# Patient Record
Sex: FEMALE | URBAN - METROPOLITAN AREA
[De-identification: names, ages, dates, MRNs, and addresses within clinical notes are randomized per-mention and may not be internally consistent; named-entity substitution may affect disease eponyms.]

---

## 2019-10-12 PROBLEM — E28.2 PCOS (POLYCYSTIC OVARIAN SYNDROME): Status: ACTIVE | Noted: 2019-10-12

## 2022-01-26 PROBLEM — M06.00 SERONEGATIVE RHEUMATOID ARTHRITIS (HCC): Status: ACTIVE | Noted: 2022-01-26

## 2022-07-11 LAB
AMB EXT TREPONEMAL ANTIBODIES: NONREACTIVE
ANTIBODY SCREEN OB: NEGATIVE
HEPATITIS B SURFACE ANTIGEN OB: NEGATIVE
HIV RESULT OB: NEGATIVE

## 2022-11-08 LAB
AMB EXT TREPONEMAL ANTIBODIES: NONREACTIVE
HIV RESULT OB: NEGATIVE

## 2022-12-03 NOTE — TRIAGE
[facility]      Triage Note    Margi Edwards Patient Status:  Outpatient    2000 MRN [id number]   Location [address] Attending Dolores Avitia MD   [address] Day # 0 PCP Paulino Temple     [facility] Para: [id number]  Estimated Date of Delivery: 1/15/23  Gestation: 33w6d    Chief Complaint    Vaginal Bleeding         Allergies:  No Known Allergies    Orders Placed This Encounter      FDP, PLASMA      Fibrinogen Activity      PTT, Activated      Prothrombin Time (PT)      ABO GROUPING      Lab Results   Component Value Date    PTT 30.4 2022    INR 1.09 2022       [place] UA  No results found for: Malone Person, Paullette Dust, Lovette Dam, Mago Kate, PHUR, PROTURINE, UROBILI, URINENITRITE, URINELEUK, URINECUL    UA  No results found for: Genaro Demark, SPECGRAVITY, PROUR, GLUUR, KETUR, BILUR, BLOODURINE, NITRITE, UROBILINOGEN, LEUUR, UASA     22  1135   BP: 122/76   Pulse: 99       NST  Variability: Moderate           Accelerations: Yes           Decelerations: None            Baseline: 135 BPM           Uterine Irritability: No           Contractions: Not present                                        Acoustic Stimulator: No           Nonstress Test Interpretation: Reactive           Nonstress Test Second Interpretation: Reactive          FHR Category: Category I             Additional Comments       Patient presents with:  Vaginal Bleeding: Noted small amount of bright red in the toilet this morning. Pt states now light pink with wiping. EFM tracing reactive with no contractions noted. No further bleeding noted while in triage. Sterile spec exam done. Scant bloody mucous noted. SVE done-0/0/-3. No bleeding noted with vaginal exam. Lab results, speculum and SVE findings and reactive NST reported to Dr Sravanthi Ram. Discharge order received. Pt instructed on pelvic rest and to report vaginal bleeding to OB.  Kick count instructions reviewed with pt. Pt states understanding. Discharged to home.     Gavin Rosenberg RN  12/3/2022 1:19 PM

## 2022-12-04 NOTE — PROGRESS NOTES
Physician Evaluation      NST Interpretation: Reactive    Disposition:   Discharged    Comments:    Abruption labs normal.  acontractile with closed cervix.   F/u as scheduled in office    Charla Ryan MD

## 2022-12-20 LAB — STREP GP B CULT OB: NEGATIVE

## 2023-01-01 ENCOUNTER — HOSPITAL ENCOUNTER (OUTPATIENT)
Facility: HOSPITAL | Age: 23
Discharge: HOME | End: 2023-01-01
Attending: OBSTETRICS & GYNECOLOGY | Admitting: OBSTETRICS & GYNECOLOGY
Payer: COMMERCIAL

## 2023-01-01 VITALS
TEMPERATURE: 98 F | SYSTOLIC BLOOD PRESSURE: 129 MMHG | RESPIRATION RATE: 18 BRPM | HEART RATE: 98 BPM | DIASTOLIC BLOOD PRESSURE: 73 MMHG

## 2023-01-01 PROCEDURE — 59025 FETAL NON-STRESS TEST: CPT

## 2023-01-01 PROCEDURE — 99214 OFFICE O/P EST MOD 30 MIN: CPT

## 2023-01-01 NOTE — PROGRESS NOTES
Pt is a 20year old female admitted to TR3/TR3-A. Patient presents with:  R/o Rom: Pt presents with c/o LOF since 130am. +FM, denies contractions     Pt is  38w0d intra-uterine pregnancy. History obtained, consents signed. Oriented to room, staff, and plan of care.

## 2023-01-14 ENCOUNTER — HOSPITAL ENCOUNTER (INPATIENT)
Facility: HOSPITAL | Age: 23
LOS: 2 days | Discharge: HOME | End: 2023-01-16
Attending: OBSTETRICS & GYNECOLOGY | Admitting: OBSTETRICS & GYNECOLOGY
Payer: COMMERCIAL

## 2023-01-14 ENCOUNTER — ANESTHESIA EVENT (OUTPATIENT)
Dept: OBGYN UNIT | Facility: HOSPITAL | Age: 23
End: 2023-01-14
Payer: COMMERCIAL

## 2023-01-14 ENCOUNTER — ANESTHESIA (OUTPATIENT)
Dept: OBGYN UNIT | Facility: HOSPITAL | Age: 23
End: 2023-01-14
Payer: COMMERCIAL

## 2023-01-14 PROBLEM — Z34.90 PREGNANCY (HCC): Status: ACTIVE | Noted: 2023-01-14

## 2023-01-14 PROBLEM — Z34.90 PREGNANCY: Status: ACTIVE | Noted: 2023-01-14

## 2023-01-14 LAB
ANTIBODY SCREEN: NEGATIVE
BASOPHILS # BLD AUTO: 0.03 X10(3) UL (ref 0–0.2)
BASOPHILS NFR BLD AUTO: 0.2 %
DEPRECATED RDW RBC AUTO: 42.2 FL (ref 35.1–46.3)
EOSINOPHIL # BLD AUTO: 0.11 X10(3) UL (ref 0–0.7)
EOSINOPHIL NFR BLD AUTO: 0.8 %
ERYTHROCYTE [DISTWIDTH] IN BLOOD BY AUTOMATED COUNT: 13.7 % (ref 11–15)
GLUCOSE BLDC GLUCOMTR-MCNC: 107 MG/DL (ref 70–99)
GLUCOSE BLDC GLUCOMTR-MCNC: 117 MG/DL (ref 70–99)
GLUCOSE BLDC GLUCOMTR-MCNC: 121 MG/DL (ref 70–99)
HCT VFR BLD AUTO: 35.3 %
HGB BLD-MCNC: 11.1 G/DL
IMM GRANULOCYTES # BLD AUTO: 0.1 X10(3) UL (ref 0–1)
IMM GRANULOCYTES NFR BLD: 0.8 %
LYMPHOCYTES # BLD AUTO: 1.84 X10(3) UL (ref 1–4)
LYMPHOCYTES NFR BLD AUTO: 14.1 %
MCH RBC QN AUTO: 26.6 PG (ref 26–34)
MCHC RBC AUTO-ENTMCNC: 31.4 G/DL (ref 31–37)
MCV RBC AUTO: 84.4 FL
MONOCYTES # BLD AUTO: 0.73 X10(3) UL (ref 0.1–1)
MONOCYTES NFR BLD AUTO: 5.6 %
NEUTROPHILS # BLD AUTO: 10.23 X10 (3) UL (ref 1.5–7.7)
NEUTROPHILS # BLD AUTO: 10.23 X10(3) UL (ref 1.5–7.7)
NEUTROPHILS NFR BLD AUTO: 78.5 %
PLATELET # BLD AUTO: 332 10(3)UL (ref 150–450)
RBC # BLD AUTO: 4.18 X10(6)UL
RH BLOOD TYPE: POSITIVE
RH BLOOD TYPE: POSITIVE
SARS-COV-2 RNA RESP QL NAA+PROBE: NOT DETECTED
WBC # BLD AUTO: 13 X10(3) UL (ref 4–11)

## 2023-01-14 PROCEDURE — 0HQ9XZZ REPAIR PERINEUM SKIN, EXTERNAL APPROACH: ICD-10-PCS | Performed by: OBSTETRICS & GYNECOLOGY

## 2023-01-14 PROCEDURE — 88307 TISSUE EXAM BY PATHOLOGIST: CPT | Performed by: OBSTETRICS & GYNECOLOGY

## 2023-01-14 PROCEDURE — 86850 RBC ANTIBODY SCREEN: CPT | Performed by: OBSTETRICS & GYNECOLOGY

## 2023-01-14 PROCEDURE — 99214 OFFICE O/P EST MOD 30 MIN: CPT

## 2023-01-14 PROCEDURE — 85025 COMPLETE CBC W/AUTO DIFF WBC: CPT | Performed by: OBSTETRICS & GYNECOLOGY

## 2023-01-14 PROCEDURE — 82962 GLUCOSE BLOOD TEST: CPT

## 2023-01-14 PROCEDURE — 86900 BLOOD TYPING SEROLOGIC ABO: CPT | Performed by: OBSTETRICS & GYNECOLOGY

## 2023-01-14 PROCEDURE — 86901 BLOOD TYPING SEROLOGIC RH(D): CPT | Performed by: OBSTETRICS & GYNECOLOGY

## 2023-01-14 PROCEDURE — 10H07YZ INSERTION OF OTHER DEVICE INTO PRODUCTS OF CONCEPTION, VIA NATURAL OR ARTIFICIAL OPENING: ICD-10-PCS | Performed by: OBSTETRICS & GYNECOLOGY

## 2023-01-14 RX ORDER — IBUPROFEN 600 MG/1
600 TABLET ORAL EVERY 6 HOURS
Status: DISCONTINUED | OUTPATIENT
Start: 2023-01-14 | End: 2023-01-16

## 2023-01-14 RX ORDER — ACETAMINOPHEN 500 MG
500 TABLET ORAL EVERY 6 HOURS PRN
Status: DISCONTINUED | OUTPATIENT
Start: 2023-01-14 | End: 2023-01-14

## 2023-01-14 RX ORDER — ACETAMINOPHEN 500 MG
1000 TABLET ORAL EVERY 6 HOURS PRN
Status: DISCONTINUED | OUTPATIENT
Start: 2023-01-14 | End: 2023-01-16

## 2023-01-14 RX ORDER — TRISODIUM CITRATE DIHYDRATE AND CITRIC ACID MONOHYDRATE 500; 334 MG/5ML; MG/5ML
30 SOLUTION ORAL AS NEEDED
Status: DISCONTINUED | OUTPATIENT
Start: 2023-01-14 | End: 2023-01-14 | Stop reason: HOSPADM

## 2023-01-14 RX ORDER — IBUPROFEN 600 MG/1
600 TABLET ORAL EVERY 6 HOURS PRN
Status: DISCONTINUED | OUTPATIENT
Start: 2023-01-14 | End: 2023-01-16

## 2023-01-14 RX ORDER — BUPIVACAINE HCL/0.9 % NACL/PF 0.25 %
5 PLASTIC BAG, INJECTION (ML) EPIDURAL AS NEEDED
Status: DISCONTINUED | OUTPATIENT
Start: 2023-01-14 | End: 2023-01-14

## 2023-01-14 RX ORDER — DOCUSATE SODIUM 100 MG/1
100 CAPSULE, LIQUID FILLED ORAL
Status: DISCONTINUED | OUTPATIENT
Start: 2023-01-14 | End: 2023-01-16

## 2023-01-14 RX ORDER — SIMETHICONE 80 MG
80 TABLET,CHEWABLE ORAL 3 TIMES DAILY PRN
Status: DISCONTINUED | OUTPATIENT
Start: 2023-01-14 | End: 2023-01-16

## 2023-01-14 RX ORDER — TERBUTALINE SULFATE 1 MG/ML
0.25 INJECTION, SOLUTION SUBCUTANEOUS AS NEEDED
Status: DISCONTINUED | OUTPATIENT
Start: 2023-01-14 | End: 2023-01-14 | Stop reason: HOSPADM

## 2023-01-14 RX ORDER — BUPIVACAINE HYDROCHLORIDE 2.5 MG/ML
20 INJECTION, SOLUTION EPIDURAL; INFILTRATION; INTRACAUDAL ONCE
Status: DISCONTINUED | OUTPATIENT
Start: 2023-01-14 | End: 2023-01-14 | Stop reason: HOSPADM

## 2023-01-14 RX ORDER — ACETAMINOPHEN 500 MG
1000 TABLET ORAL EVERY 6 HOURS PRN
Status: DISCONTINUED | OUTPATIENT
Start: 2023-01-14 | End: 2023-01-14 | Stop reason: HOSPADM

## 2023-01-14 RX ORDER — AMMONIA INHALANTS 0.04 G/.3ML
0.3 INHALANT RESPIRATORY (INHALATION) AS NEEDED
Status: DISCONTINUED | OUTPATIENT
Start: 2023-01-14 | End: 2023-01-14 | Stop reason: HOSPADM

## 2023-01-14 RX ORDER — NALBUPHINE HCL 10 MG/ML
2.5 AMPUL (ML) INJECTION
Status: DISCONTINUED | OUTPATIENT
Start: 2023-01-14 | End: 2023-01-14

## 2023-01-14 RX ORDER — ONDANSETRON 2 MG/ML
4 INJECTION INTRAMUSCULAR; INTRAVENOUS EVERY 6 HOURS PRN
Status: DISCONTINUED | OUTPATIENT
Start: 2023-01-14 | End: 2023-01-16

## 2023-01-14 RX ORDER — ONDANSETRON 2 MG/ML
4 INJECTION INTRAMUSCULAR; INTRAVENOUS EVERY 6 HOURS PRN
Status: DISCONTINUED | OUTPATIENT
Start: 2023-01-14 | End: 2023-01-14 | Stop reason: HOSPADM

## 2023-01-14 RX ORDER — DEXTROSE, SODIUM CHLORIDE, SODIUM LACTATE, POTASSIUM CHLORIDE, AND CALCIUM CHLORIDE 5; .6; .31; .03; .02 G/100ML; G/100ML; G/100ML; G/100ML; G/100ML
INJECTION, SOLUTION INTRAVENOUS AS NEEDED
Status: DISCONTINUED | OUTPATIENT
Start: 2023-01-14 | End: 2023-01-14 | Stop reason: HOSPADM

## 2023-01-14 RX ORDER — BUPIVACAINE HYDROCHLORIDE 2.5 MG/ML
INJECTION, SOLUTION EPIDURAL; INFILTRATION; INTRACAUDAL
Status: COMPLETED | OUTPATIENT
Start: 2023-01-14 | End: 2023-01-14

## 2023-01-14 RX ORDER — ACETAMINOPHEN 500 MG
500 TABLET ORAL EVERY 6 HOURS PRN
Status: DISCONTINUED | OUTPATIENT
Start: 2023-01-14 | End: 2023-01-16

## 2023-01-14 RX ORDER — BISACODYL 10 MG
10 SUPPOSITORY, RECTAL RECTAL ONCE AS NEEDED
Status: DISCONTINUED | OUTPATIENT
Start: 2023-01-14 | End: 2023-01-16

## 2023-01-14 RX ORDER — CHOLECALCIFEROL (VITAMIN D3) 25 MCG
1 TABLET,CHEWABLE ORAL DAILY
Status: DISCONTINUED | OUTPATIENT
Start: 2023-01-14 | End: 2023-01-16

## 2023-01-14 RX ORDER — SODIUM CHLORIDE, SODIUM LACTATE, POTASSIUM CHLORIDE, CALCIUM CHLORIDE 600; 310; 30; 20 MG/100ML; MG/100ML; MG/100ML; MG/100ML
INJECTION, SOLUTION INTRAVENOUS CONTINUOUS
Status: DISCONTINUED | OUTPATIENT
Start: 2023-01-14 | End: 2023-01-14 | Stop reason: HOSPADM

## 2023-01-14 RX ORDER — AMMONIA INHALANTS 0.04 G/.3ML
0.3 INHALANT RESPIRATORY (INHALATION) AS NEEDED
Status: DISCONTINUED | OUTPATIENT
Start: 2023-01-14 | End: 2023-01-16

## 2023-01-14 RX ORDER — LIDOCAINE HYDROCHLORIDE AND EPINEPHRINE 15; 5 MG/ML; UG/ML
INJECTION, SOLUTION EPIDURAL
Status: COMPLETED | OUTPATIENT
Start: 2023-01-14 | End: 2023-01-14

## 2023-01-14 RX ORDER — DIAPER,BRIEF,INFANT-TODD,DISP
1 EACH MISCELLANEOUS EVERY 6 HOURS PRN
Status: DISCONTINUED | OUTPATIENT
Start: 2023-01-14 | End: 2023-01-16

## 2023-01-14 RX ORDER — LIDOCAINE HYDROCHLORIDE AND EPINEPHRINE 20; 5 MG/ML; UG/ML
INJECTION, SOLUTION EPIDURAL; INFILTRATION; INTRACAUDAL; PERINEURAL
Status: DISCONTINUED
Start: 2023-01-14 | End: 2023-01-14 | Stop reason: WASHOUT

## 2023-01-14 RX ORDER — LIDOCAINE HYDROCHLORIDE 10 MG/ML
30 INJECTION, SOLUTION EPIDURAL; INFILTRATION; INTRACAUDAL; PERINEURAL ONCE
Status: DISCONTINUED | OUTPATIENT
Start: 2023-01-14 | End: 2023-01-14 | Stop reason: HOSPADM

## 2023-01-14 RX ORDER — LIDOCAINE HYDROCHLORIDE 10 MG/ML
INJECTION, SOLUTION INFILTRATION; PERINEURAL
Status: COMPLETED | OUTPATIENT
Start: 2023-01-14 | End: 2023-01-14

## 2023-01-14 RX ADMIN — LIDOCAINE HYDROCHLORIDE AND EPINEPHRINE 5 ML: 15; 5 INJECTION, SOLUTION EPIDURAL at 06:21:00

## 2023-01-14 RX ADMIN — LIDOCAINE HYDROCHLORIDE 5 ML: 10 INJECTION, SOLUTION INFILTRATION; PERINEURAL at 06:21:00

## 2023-01-14 RX ADMIN — BUPIVACAINE HYDROCHLORIDE 5 ML: 2.5 INJECTION, SOLUTION EPIDURAL; INFILTRATION; INTRACAUDAL at 06:21:00

## 2023-01-14 NOTE — PLAN OF CARE
Problem: PAIN - ADULT  Goal: Verbalizes/displays adequate comfort level or patient's stated pain goal  Description: INTERVENTIONS:  - Encourage pt to monitor pain and request assistance  - Assess pain using appropriate pain scale  - Administer analgesics based on type and severity of pain and evaluate response  - Implement non-pharmacological measures as appropriate and evaluate response  - Consider cultural and social influences on pain and pain management  - Manage/alleviate anxiety  - Utilize distraction and/or relaxation techniques  - Monitor for opioid side effects  - Notify MD/LIP if interventions unsuccessful or patient reports new pain  - Anticipate increased pain with activity and pre-medicate as appropriate  1/14/2023 1718 by Yudith Nichols RN  Outcome: Progressing  1/14/2023 1718 by Yudith Nichols RN  Outcome: Progressing     Problem: ANXIETY  Goal: Will report anxiety at manageable levels  Description: INTERVENTIONS:  - Administer medication as ordered  - Teach and rehearse alternative coping skills  - Provide emotional support with 1:1 interaction with staff  Outcome: Progressing     Problem: POSTPARTUM  Goal: Long Term Goal:Experiences normal postpartum course  Description: INTERVENTIONS:  - Assess and monitor vital signs and lab values. - Assess fundus and lochia. - Provide ice/sitz baths for perineum discomfort. - Monitor healing of incision/episiotomy/laceration, and assess for signs and symptoms of infection and hematoma. - Assess bladder function and monitor for bladder distention.  - Provide/instruct/assist with pericare as needed. - Provide VTE prophylaxis as needed. - Monitor bowel function.  - Encourage ambulation and provide assistance as needed. - Assess and monitor emotional status and provide social service/psych resources as needed. - Utilize standard precautions and use personal protective equipment as indicated.  Ensure aseptic care of all intravenous lines and invasive tubes/drains.  - Obtain immunization and exposure to communicable diseases history. Outcome: Progressing  Goal: Optimize infant feeding at the breast  Description: INTERVENTIONS:  - Initiate breast feeding within first hour after birth. - Monitor effectiveness of current breast feeding efforts. - Assess support systems available to mother/family.  - Identify cultural beliefs/practices regarding lactation, letdown techniques, maternal food preferences. - Assess mother's knowledge and previous experience with breast feeding.  - Provide information as needed about early infant feeding cues (e.g., rooting, lip smacking, sucking fingers/hand) versus late cue of crying.  - Discuss/demonstrate breast feeding aids (e.g., infant sling, nursing footstool/pillows, and breast pumps). - Encourage mother/other family members to express feelings/concerns, and actively listen. - Educate father/SO about benefits of breast feeding and how to manage common lactation challenges. - Recommend avoidance of specific medications or substances incompatible with breast feeding.  - Assess and monitor for signs of nipple pain/trauma. - Instruct and provide assistance with proper latch. - Review techniques for milk expression (breast pumping) and storage of breast milk. Provide pumping equipment/supplies, instructions and assistance, as needed. - Encourage rooming-in and breast feeding on demand.  - Encourage skin-to-skin contact. - Provide LC support as needed. - Assess for and manage engorgement. - Provide breast feeding education handouts and information on community breast feeding support. Outcome: Progressing  Goal: Establishment of adequate milk supply with medication/procedure interruptions  Description: INTERVENTIONS:  - Review techniques for milk expression (breast pumping). - Provide pumping equipment/supplies, instructions, and assistance until it is safe to breastfeed infant.   Outcome: Progressing  Goal: Appropriate maternal -  bonding  Description: INTERVENTIONS:  - Assess caregiver- interactions. - Assess caregiver's emotional status and coping mechanisms. - Encourage caregiver to participate in  daily care. - Assess support systems available to mother/family.  - Provide /case management support as needed. Outcome: Progressing       Received patient into room 359 via wheelchair . Bedside shift report received from STEPH Sanches. Pt transferred to bed. Bed in lowest and locked position. Side rails up x2. VSS. IV site unremarkable. Baby present in open crib. ID bands matched with L&D RN. Patient and family oriented to unit, room and call light within reach. Safety measures in place, POC followed. Will continue to monitor per protocol. Advised to call for assistance to bathroom.

## 2023-01-14 NOTE — L&D DELIVERY NOTE
Amparo Patel [[id number]]    Labor Events     labor?: No   steroids?: None  Antibiotics received during labor?: No  Antibiotics (enter # doses in comment): none  Rupture date/time: 2023 2300     Rupture type: SROM  Fluid color: Clear  Induction: None     Labor Event Times    Dilation complete date/time: 2023 1405      Presentation    No data filed     Operative Delivery    No data filed     Shoulder Dystocia    No data filed     Anesthesia    No data filed      Delivery    Head delivery date/time: 2023 14:27:15   Delivery date/time:  23 14:27:25     Details:        Delivery Providers     Delivery personnel:  Provider Role    Baby Nurse    Delivery Nurse         Cord    No data filed      Measurements    Weight: 3200 g 7 lb 0.9 oz          Placenta    No data filed     Apgars    Living status: Living   Apgar Scoring Key:    0 1 2    Skin color Blue or pale Acrocyanotic Completely pink    Heart rate Absent <100 bpm >100 bpm    Reflex irritability No response Grimace Cry or active withdrawal    Muscle tone Limp Some flexion Active motion    Respiratory effort Absent Weak cry; hypoventilation Good, crying              1 Minute:  5 Minute:  10 Minute:  15 Minute:  20 Minute:    Skin color: 1  1       Heart rate: 2  2       Reflex irritablity: 2  2       Muscle tone: 2  2       Respiratory effort: 2  2       Total: 9  9          Apgars assigned by: Alena Girard RN     Skin to Skin    Skin to skin initiated date/time: 2023 1442  Skin to skin with:  Mother     Vaginal Count    No data filed     Delivery (Maternal)    No data filed          [facility]    Vaginal Delivery Note    Sherrill Marquez Patient Status:  Inpatient    2000 MRN [id number]   Location [address] [facility] Attending Laci Coronel MD   [facility] Day # 0 PCP [address]     Delivery     Infant  Date of Delivery: 2023   Time of Delivery: 2:27 PM  Delivery Type:      Infant Sex  Information for the patient's : Cuong Gray Girl [[id number]]   female    Infant Birthweight  Information for the patient's : Cuong Gray Girl [[id number]]   7 lb 0.9 oz (3.2 kg)     Presentation    Position          Apgars:  1 minute: 9               5 minutes: 9                        10 minutes:      Placenta:  Date/Time of Delivery:     Delivery: spontaneous  Placenta to Pathology: yes due to maternal fever    Umbilical Cord:  Cord Gases Submitted: yes  Cord Blood/Tissue Collection: yes  Cord Complications: none  Sponge and Needle Counts:  Verified    Maternal Anesthesia: epidural     Episiotomy/Laceration Repair  Laceration: perineal first degree    Delivery Complications  none    Neonatologist Present: yes due to maternal fever    Delivery Narrative: Patient pushed for 25 minutes prior to delivering a live female in ROSANGELA position over intact perineum after nose & mouth bulb suctioned. Infant then delivered in total. Umbilical cord doubly clamped & cut. Infant handed to awaiting mother then to the warmer where the  NP was present to evaluate. First degree laceration repaired with 2-0 Vicryl. No cervical / periuretheral / sulcus lacerations. Placenta delivered spontaneously intact & normal in appearance with 3 vessel cord. EBL 200cc. Fundus firm after delivery. Rectal exam WNL after repair. Mother and baby are doing well.        Madie Womack MD   2023  2:45 PM

## 2023-01-14 NOTE — PROGRESS NOTES
Pt is a 24year old female admitted to TR1/TR1-A. Patient presents with:  R/o Rom: Pt states she felt LOF around 11pm last night with CTXs shortly after, +FM     Pt is  39w6d intra-uterine pregnancy. History obtained, consents signed. Oriented to room, staff, and plan of care.

## 2023-01-14 NOTE — ANESTHESIA POSTPROCEDURE EVALUATION
Patient: Shoaib Loving    Procedure Summary     Date: 01/14/23 Room / Location:     Anesthesia Start: 0621 Anesthesia Stop: 1430    Procedure: LABOR ANALGESIA Diagnosis:     Scheduled Providers:  Anesthesiologist: Graciela Ware MD    Anesthesia Type: epidural ASA Status: 2          Anesthesia Type: epidural    Vitals Value Taken Time   /78 01/14/23 1629   Temp 36.8 01/14/23 1629   Pulse 124 01/14/23 1625   Resp 16 01/14/23 1629   SpO2 99 % 01/14/23 1625   Vitals shown include unvalidated device data.     [facility] Evaluation:   Patient Evaluated in floor  Patient Participation: complete - patient participated  Level of Consciousness: awake and alert  Pain Score: 0  Pain Management: adequate  Airway Patency:patent  Yes    Cardiovascular Status: acceptable and stable  Respiratory Status: acceptable  Postoperative Hydration acceptable      Geronimo Rubio MD  1/14/2023 4:29 PM

## 2023-01-14 NOTE — PROGRESS NOTES
Patient up to bathroom with assist x 2. Voided 200 void at this time. Patient transferred to mother/baby room 359 per wheelchair in stable condition with baby and personal belongings. Accompanied by significant other and staff. Report given to mother/baby RN.

## 2023-01-14 NOTE — ANESTHESIA PROCEDURE NOTES
Labor Analgesia    Date/Time: 1/14/2023 6:21 AM  Performed by: Aislinn Vazquez MD  Authorized by: Aislinn Vazquez MD       General Information and Staff    Start Time:  1/14/2023 6:21 AM  End Time:  1/14/2023 6:21 AM  Anesthesiologist:  Aislinn Vazquez MD  Performed by:   Anesthesiologist  Patient Location:  OB  Reason for Block: labor epidural    Preanesthetic Checklist: patient identified, IV checked, risks and benefits discussed, monitors and equipment checked, pre-op evaluation, timeout performed, anesthesia consent and sterile technique used      Procedure Details    Patient Position:  Sitting  Prep: ChloraPrep    Monitoring:  Heart rate  Approach:  Midline    Epidural Needle    Injection Technique:  ÁNGEL air  Needle Type:  Tuohy  Needle Gauge:  18 G  Needle Length:  3.5 in  Location:  L2-3    Spinal Needle      Catheter    Catheter Type:  Multi-orifice  Catheter Size:  20 G  Test Dose:  Negative    Assessment      Additional Comments

## 2023-01-15 LAB
BASOPHILS # BLD AUTO: 0.05 X10(3) UL (ref 0–0.2)
BASOPHILS NFR BLD AUTO: 0.2 %
DEPRECATED RDW RBC AUTO: 43.6 FL (ref 35.1–46.3)
EOSINOPHIL # BLD AUTO: 0.11 X10(3) UL (ref 0–0.7)
EOSINOPHIL NFR BLD AUTO: 0.5 %
ERYTHROCYTE [DISTWIDTH] IN BLOOD BY AUTOMATED COUNT: 14.2 % (ref 11–15)
HCT VFR BLD AUTO: 30.6 %
HGB BLD-MCNC: 9.7 G/DL
IMM GRANULOCYTES # BLD AUTO: 0.21 X10(3) UL (ref 0–1)
IMM GRANULOCYTES NFR BLD: 1 %
LYMPHOCYTES # BLD AUTO: 2.13 X10(3) UL (ref 1–4)
LYMPHOCYTES NFR BLD AUTO: 9.7 %
MCH RBC QN AUTO: 26.7 PG (ref 26–34)
MCHC RBC AUTO-ENTMCNC: 31.7 G/DL (ref 31–37)
MCV RBC AUTO: 84.3 FL
MONOCYTES # BLD AUTO: 1.12 X10(3) UL (ref 0.1–1)
MONOCYTES NFR BLD AUTO: 5.1 %
NEUTROPHILS # BLD AUTO: 18.28 X10 (3) UL (ref 1.5–7.7)
NEUTROPHILS # BLD AUTO: 18.28 X10(3) UL (ref 1.5–7.7)
NEUTROPHILS NFR BLD AUTO: 83.5 %
PLATELET # BLD AUTO: 284 10(3)UL (ref 150–450)
RBC # BLD AUTO: 3.63 X10(6)UL
WBC # BLD AUTO: 21.9 X10(3) UL (ref 4–11)

## 2023-01-15 PROCEDURE — 85025 COMPLETE CBC W/AUTO DIFF WBC: CPT | Performed by: OBSTETRICS & GYNECOLOGY

## 2023-01-15 NOTE — LACTATION NOTE
LACTATION NOTE - MOTHER      Evaluation Type: Inpatient    Problems identified  Problems identified: Knowledge deficit;Milk supply WNL  Problems Identified Other: nipple shield and supplementation without pumping    Maternal history  Maternal history: Gestational diabetes;Depression  Other/comment: arthritis    Breastfeeding goal  Breastfeeding goal: To maintain breast milk feeding per patient goal    Maternal Assessment  Bilateral Breasts: Symmetrical;Soft  Bilateral Nipples: Colostrum easily expressed;Sore;Slightly everted/short  Prior breastfeeding experience (comment below): Primip  Breastfeeding Assistance: Breastfeeding assistance provided with permission    Pain assessment  Treatment of Sore Nipples: Lanolin; Other (Comment) (pt is using her own breast shells)    Guidelines for use of:  Equipment: Lanolin; Nipple shield;Breast shells  Current use of pump[de-identified] not yet initiated  Suggested use of pump: Pump each time a supplement is offered;Pump after nursing if a nipple shield is used;Pump if infant is not latching to breast  Other (comment): Reviewed first day  normal  feeding behaviors and expected output, supplemenation guideines provided as pt began supplementation overnight. Pumping was encouraged as pt is latching with nipple shield. ETC lactation when infant showing feeding cues for latch assist and teaching,  [facility] # on whiteboard. Pt  was very upset that infant was agitated and unable to latch at the time of  [facility] consult. Infant was calm being held skin to skin when LC left the room.

## 2023-01-15 NOTE — LACTATION NOTE
This note was copied from a baby's chart. 01/15/23 1040   Evaluation Type   Evaluation Type Inpatient   Problems & Assessment   Problems Diagnosed or Identified Latch difficulty   Infant Assessment Hunger cues present   Muscle tone Appropriate for GA   Feeding Assessment   Summary Current Feeding Breastfeeding with formula supplement;Breastfeeding using a nipple shield   Last 24 hour feeding summary supplemented x 2 with 25 ml formula with infant < 20 hours age   Breastfeeding Assessment Assisted with breastfeeding w/mother's permission; Fussy infant, unable to sustain suckling to breast   Breastfeeding Positions football;right breast;cross cradle;left breast   Latch 0   Other (comment) Called by MOB to assist/ assess latch. Infant extremely agitated during latch attempts; attempted  x 3 after calming with persistant refusal and agitation. Supplement volumes of 25 ml formula  given X 2 previous feedings with infant < 20 hours age. Encouraged MOB to hold baby skin to skin and call for latch assist when infant is calm and showing feeding cues.

## 2023-01-15 NOTE — PLAN OF CARE
Problem: Patient Centered Care  Goal: Patient preferences are identified and integrated in the patient's plan of care  Description: Interventions:  - What would you like us to know as we care for you?   - Provide timely, complete, and accurate information to patient/family  - Incorporate patient and family knowledge, values, beliefs, and cultural backgrounds into the planning and delivery of care  - Encourage patient/family to participate in care and decision-making at the level they choose  - Honor patient and family perspectives and choices  Outcome: Progressing     Problem: Patient/Family Goals  Goal: Patient/Family Long Term Goal  Description: Patient's Long Term Goal:     Interventions:  -   - See additional Care Plan goals for specific interventions  Outcome: Progressing  Goal: Patient/Family Short Term Goal  Description: Patient's Short Term Goal:     Interventions:   -   - See additional Care Plan goals for specific interventions  Outcome: Progressing     Problem: PAIN - ADULT  Goal: Verbalizes/displays adequate comfort level or patient's stated pain goal  Description: INTERVENTIONS:  - Encourage pt to monitor pain and request assistance  - Assess pain using appropriate pain scale  - Administer analgesics based on type and severity of pain and evaluate response  - Implement non-pharmacological measures as appropriate and evaluate response  - Consider cultural and social influences on pain and pain management  - Manage/alleviate anxiety  - Utilize distraction and/or relaxation techniques  - Monitor for opioid side effects  - Notify MD/LIP if interventions unsuccessful or patient reports new pain  - Anticipate increased pain with activity and pre-medicate as appropriate  Outcome: Progressing     Problem: ANXIETY  Goal: Will report anxiety at manageable levels  Description: INTERVENTIONS:  - Administer medication as ordered  - Teach and rehearse alternative coping skills  - Provide emotional support with 1:1 interaction with staff  Outcome: Progressing     Problem: POSTPARTUM  Goal: Long Term Goal:Experiences normal postpartum course  Description: INTERVENTIONS:  - Assess and monitor vital signs and lab values. - Assess fundus and lochia. - Provide ice/sitz baths for perineum discomfort. - Monitor healing of incision/episiotomy/laceration, and assess for signs and symptoms of infection and hematoma. - Assess bladder function and monitor for bladder distention.  - Provide/instruct/assist with pericare as needed. - Provide VTE prophylaxis as needed. - Monitor bowel function.  - Encourage ambulation and provide assistance as needed. - Assess and monitor emotional status and provide social service/psych resources as needed. - Utilize standard precautions and use personal protective equipment as indicated. Ensure aseptic care of all intravenous lines and invasive tubes/drains.  - Obtain immunization and exposure to communicable diseases history. Outcome: Progressing  Goal: Optimize infant feeding at the breast  Description: INTERVENTIONS:  - Initiate breast feeding within first hour after birth. - Monitor effectiveness of current breast feeding efforts. - Assess support systems available to mother/family.  - Identify cultural beliefs/practices regarding lactation, letdown techniques, maternal food preferences. - Assess mother's knowledge and previous experience with breast feeding.  - Provide information as needed about early infant feeding cues (e.g., rooting, lip smacking, sucking fingers/hand) versus late cue of crying.  - Discuss/demonstrate breast feeding aids (e.g., infant sling, nursing footstool/pillows, and breast pumps). - Encourage mother/other family members to express feelings/concerns, and actively listen. - Educate father/SO about benefits of breast feeding and how to manage common lactation challenges.   - Recommend avoidance of specific medications or substances incompatible with breast feeding.  - Assess and monitor for signs of nipple pain/trauma. - Instruct and provide assistance with proper latch. - Review techniques for milk expression (breast pumping) and storage of breast milk. Provide pumping equipment/supplies, instructions and assistance, as needed. - Encourage rooming-in and breast feeding on demand.  - Encourage skin-to-skin contact. - Provide LC support as needed. - Assess for and manage engorgement. - Provide breast feeding education handouts and information on community breast feeding support. Outcome: Progressing  Goal: Establishment of adequate milk supply with medication/procedure interruptions  Description: INTERVENTIONS:  - Review techniques for milk expression (breast pumping). - Provide pumping equipment/supplies, instructions, and assistance until it is safe to breastfeed infant. Outcome: Progressing  Goal: Appropriate maternal -  bonding  Description: INTERVENTIONS:  - Assess caregiver- interactions. - Assess caregiver's emotional status and coping mechanisms. - Encourage caregiver to participate in  daily care. - Assess support systems available to mother/family.  - Provide /case management support as needed. Outcome: Progressing    Pain controlled with Tylenol and Motrin. Voiding freely. Fundus firm and midline. Bleeding WDL. Up ad maria luz. Diet tolerated. Interacting appropriately with infant. Will continue plan of care.

## 2023-01-16 VITALS
DIASTOLIC BLOOD PRESSURE: 85 MMHG | BODY MASS INDEX: 31.24 KG/M2 | WEIGHT: 183 LBS | TEMPERATURE: 99 F | HEIGHT: 64 IN | HEART RATE: 95 BPM | OXYGEN SATURATION: 98 % | RESPIRATION RATE: 16 BRPM | SYSTOLIC BLOOD PRESSURE: 130 MMHG

## 2023-01-16 RX ORDER — IBUPROFEN 600 MG/1
600 TABLET ORAL EVERY 6 HOURS PRN
Qty: 30 TABLET | Refills: 0 | Status: SHIPPED | OUTPATIENT
Start: 2023-01-16

## 2023-01-16 RX ORDER — ACETAMINOPHEN 500 MG
500 TABLET ORAL EVERY 6 HOURS PRN
Refills: 0 | Status: SHIPPED | COMMUNITY
Start: 2023-01-16

## 2023-01-16 NOTE — BH PROGRESS NOTE
[facility] attempted to see Lam Conley for EPDS consultation at which time she was having baby portraits taken    [facility] will attempt to see Lam Conley again later today and notified RN Mounika of attempted consultation. [facility]    Note to patient:  The [id number] makes medical notes like these available to patients in the interest of transparency. However, be advised this is a medical document. It is intended as peer to peer communication. It is written in medical language and may contain abbreviations or verbiage that are unfamiliar. It may appear blunt or direct. Medical documents are intended to carry relevant information, facts as evident, and the clinical opinion of the practitioner.

## 2023-01-16 NOTE — DISCHARGE INSTRUCTIONS
Call your healthcare provider right away:   -fever over 100.4  -heavy vaginal bleeding   -severe or worsening pain unrelieved by medication   -severe anxiety or depression  - Dizziness and/or changes in vision  - severe headache,   - calf pain or swelling  - Chest pain or shortness of breath. You are on pelvic rest for 6 weeks. This means nothing in vagina (no intercourse, tampons, etc.) and no baths or swimming pool. No heavy lifting (no more than the baby) until cleared by OB. [facility]  SUPPORTS         Parents support groups:  Cradle talk online (Monday & Friday at 10am)--- support group for any parents of a  in our community-- [address]-- for any expectant or new mom who may be experiencing anxiety, stress or depression. It is lead by a licensed clinical therapist with the option of in-person or online meetings: In person meets once a month for [employer], registration is required  for either option and online ( and  of the month)  virtual on [employer]. For more information for either Cradle talk or Nurturing Mom or to receive a email invite contact [email or web address]      In person Mom & Baby hour support group is every Wednesday from  am, at [address] at [address], [place] in the [address] which is located just inside the front door and to the right. No Registration required just show up, for any new babies, doesn't need to be your first!!! For information for going in person--- Email [email or web address]     Can always see information about in person group on the [address] [place] [address]    MOPS (Mothers of Preschoolers): to engage in groups through [address], go to www.mops. org and search groups by [facility] code    [address] [place] [address] Rd: [phone]   This service is provided by [facility], has a phone line dedicated women (or anyone worried about a women) who may be experiencing signs or symptoms of postpartum depression. Facebook groups-  for more support when home- [address] [facility] --- you can find mom-to-mom advice and the list of speaker topics for cradle talk program.   [address] [place], [address] [place] [address] Avenue:  [facility], Jayson [facility], [address] [facility]  Phone: [phone]    Pattynadja Abilio, [facility]: [address] [facility], [facility], 600 [facility] 53  Phone: [phone]    [facility], [address] [place] [address] Avenue: [address], [address] [place], [address]  Phone: [phone]    [facility]. Sycuan Duty: 7950 [facility], [facility] 92, 1334 [facility]  Phone: [phone]    Dr. Marsha Hilton, PhD: [address]  Phone: [phone]    [address] - multiple locations  Phone: [phone]    [facility]: [place], [facility]  Phone: [phone] [facility].: 9734 [facility] [address], [address]  Phone: [phone]    [facility], [address] [place] [address]  Phone: [phone]    [facility], [place]: [address] [facility] [address] 301 [facility] 83,8Th Floor #F [facility], [address]  Phone: [phone]    [facility]: Ramos Mendes 262 29 [facility], [address]  Phone: [phone] [address] [place], [place]: 1593 [facility] [address] 620 [facility], [address]  Phone: [phone]    [facility]: [address]  Miriam Kline  Phone: [phone]    [facility]: [address] [address] Miriam Kline  Phone: [phone]    Talon Leung[address]: [facility] [place] [address]. [employer].  Keli, [address] [place] [address]  Phone: [phone]    [address] [place] [address] Rd: [address]  Phone: [phone] Ma[address] 6: 755 [facility] 3 [facility], [address] [place] [address]  Phone: [phone]    Marck Ashley 12 SVS: [address]   Phone: [phone] Multi-Specialty: 800 [facility],6Th Floor, [facility]  Phone: [phone]    [place]: [address] [facility]  Phone: [phone]    To find more behavioral health providers in network, go to: [email or web address] and search providers by zip code, specialty, accepting new patients and other specifications.

## 2023-01-16 NOTE — PLAN OF CARE
Problem: Patient Centered Care  Goal: Patient preferences are identified and integrated in the patient's plan of care  Description: Interventions:  - What would you like us to know as we care for you?   - Provide timely, complete, and accurate information to patient/family  - Incorporate patient and family knowledge, values, beliefs, and cultural backgrounds into the planning and delivery of care  - Encourage patient/family to participate in care and decision-making at the level they choose  - Honor patient and family perspectives and choices  Outcome: Completed     Problem: Patient/Family Goals  Goal: Patient/Family Long Term Goal  Description: Patient's Long Term Goal:     Interventions:  -   - See additional Care Plan goals for specific interventions  Outcome: Completed  Goal: Patient/Family Short Term Goal  Description: Patient's Short Term Goal:     Interventions:   -   - See additional Care Plan goals for specific interventions  Outcome: Completed     Problem: PAIN - ADULT  Goal: Verbalizes/displays adequate comfort level or patient's stated pain goal  Description: INTERVENTIONS:  - Encourage pt to monitor pain and request assistance  - Assess pain using appropriate pain scale  - Administer analgesics based on type and severity of pain and evaluate response  - Implement non-pharmacological measures as appropriate and evaluate response  - Consider cultural and social influences on pain and pain management  - Manage/alleviate anxiety  - Utilize distraction and/or relaxation techniques  - Monitor for opioid side effects  - Notify MD/LIP if interventions unsuccessful or patient reports new pain  - Anticipate increased pain with activity and pre-medicate as appropriate  Outcome: Completed     Problem: ANXIETY  Goal: Will report anxiety at manageable levels  Description: INTERVENTIONS:  - Administer medication as ordered  - Teach and rehearse alternative coping skills  - Provide emotional support with 1:1 interaction with staff  Outcome: Completed     Problem: POSTPARTUM  Goal: Long Term Goal:Experiences normal postpartum course  Description: INTERVENTIONS:  - Assess and monitor vital signs and lab values. - Assess fundus and lochia. - Provide ice/sitz baths for perineum discomfort. - Monitor healing of incision/episiotomy/laceration, and assess for signs and symptoms of infection and hematoma. - Assess bladder function and monitor for bladder distention.  - Provide/instruct/assist with pericare as needed. - Provide VTE prophylaxis as needed. - Monitor bowel function.  - Encourage ambulation and provide assistance as needed. - Assess and monitor emotional status and provide social service/psych resources as needed. - Utilize standard precautions and use personal protective equipment as indicated. Ensure aseptic care of all intravenous lines and invasive tubes/drains.  - Obtain immunization and exposure to communicable diseases history. Outcome: Completed  Goal: Optimize infant feeding at the breast  Description: INTERVENTIONS:  - Initiate breast feeding within first hour after birth. - Monitor effectiveness of current breast feeding efforts. - Assess support systems available to mother/family.  - Identify cultural beliefs/practices regarding lactation, letdown techniques, maternal food preferences. - Assess mother's knowledge and previous experience with breast feeding.  - Provide information as needed about early infant feeding cues (e.g., rooting, lip smacking, sucking fingers/hand) versus late cue of crying.  - Discuss/demonstrate breast feeding aids (e.g., infant sling, nursing footstool/pillows, and breast pumps). - Encourage mother/other family members to express feelings/concerns, and actively listen. - Educate father/SO about benefits of breast feeding and how to manage common lactation challenges.   - Recommend avoidance of specific medications or substances incompatible with breast feeding.  - Assess and monitor for signs of nipple pain/trauma. - Instruct and provide assistance with proper latch. - Review techniques for milk expression (breast pumping) and storage of breast milk. Provide pumping equipment/supplies, instructions and assistance, as needed. - Encourage rooming-in and breast feeding on demand.  - Encourage skin-to-skin contact. - Provide LC support as needed. - Assess for and manage engorgement. - Provide breast feeding education handouts and information on community breast feeding support. Outcome: Completed  Goal: Establishment of adequate milk supply with medication/procedure interruptions  Description: INTERVENTIONS:  - Review techniques for milk expression (breast pumping). - Provide pumping equipment/supplies, instructions, and assistance until it is safe to breastfeed infant. Outcome: Completed  Goal: Appropriate maternal -  bonding  Description: INTERVENTIONS:  - Assess caregiver- interactions. - Assess caregiver's emotional status and coping mechanisms. - Encourage caregiver to participate in  daily care. - Assess support systems available to mother/family.  - Provide /case management support as needed.   Outcome: Completed

## 2023-01-16 NOTE — PLAN OF CARE
Problem: Patient Centered Care  Goal: Patient preferences are identified and integrated in the patient's plan of care  Description: Interventions:  - What would you like us to know as we care for you? This is my first baby. - Provide timely, complete, and accurate information to patient/family  - Incorporate patient and family knowledge, values, beliefs, and cultural backgrounds into the planning and delivery of care  - Encourage patient/family to participate in care and decision-making at the level they choose  - Honor patient and family perspectives and choices  Outcome: Progressing     Problem: Patient/Family Goals  Goal: Patient/Family Long Term Goal  Description: Patient's Long Term Goal: Return home with a healthy baby.     Interventions:  - Monitor bleeding   -Monitor fundal position   - See additional Care Plan goals for specific interventions  Outcome: Progressing  Goal: Patient/Family Short Term Goal  Description: Patient's Short Term Goal: Pain control     Interventions:   - Pain medications   -Ice and heat therapy   - See additional Care Plan goals for specific interventions  Outcome: Progressing     Problem: PAIN - ADULT  Goal: Verbalizes/displays adequate comfort level or patient's stated pain goal  Description: INTERVENTIONS:  - Encourage pt to monitor pain and request assistance  - Assess pain using appropriate pain scale  - Administer analgesics based on type and severity of pain and evaluate response  - Implement non-pharmacological measures as appropriate and evaluate response  - Consider cultural and social influences on pain and pain management  - Manage/alleviate anxiety  - Utilize distraction and/or relaxation techniques  - Monitor for opioid side effects  - Notify MD/LIP if interventions unsuccessful or patient reports new pain  - Anticipate increased pain with activity and pre-medicate as appropriate  Outcome: Progressing     Problem: ANXIETY  Goal: Will report anxiety at manageable levels  Description: INTERVENTIONS:  - Administer medication as ordered  - Teach and rehearse alternative coping skills  - Provide emotional support with 1:1 interaction with staff  Outcome: Progressing     Problem: POSTPARTUM  Goal: Long Term Goal:Experiences normal postpartum course  Description: INTERVENTIONS:  - Assess and monitor vital signs and lab values. - Assess fundus and lochia. - Provide ice/sitz baths for perineum discomfort. - Monitor healing of incision/episiotomy/laceration, and assess for signs and symptoms of infection and hematoma. - Assess bladder function and monitor for bladder distention.  - Provide/instruct/assist with pericare as needed. - Provide VTE prophylaxis as needed. - Monitor bowel function.  - Encourage ambulation and provide assistance as needed. - Assess and monitor emotional status and provide social service/psych resources as needed. - Utilize standard precautions and use personal protective equipment as indicated. Ensure aseptic care of all intravenous lines and invasive tubes/drains.  - Obtain immunization and exposure to communicable diseases history. Outcome: Progressing  Goal: Optimize infant feeding at the breast  Description: INTERVENTIONS:  - Initiate breast feeding within first hour after birth. - Monitor effectiveness of current breast feeding efforts. - Assess support systems available to mother/family.  - Identify cultural beliefs/practices regarding lactation, letdown techniques, maternal food preferences. - Assess mother's knowledge and previous experience with breast feeding.  - Provide information as needed about early infant feeding cues (e.g., rooting, lip smacking, sucking fingers/hand) versus late cue of crying.  - Discuss/demonstrate breast feeding aids (e.g., infant sling, nursing footstool/pillows, and breast pumps). - Encourage mother/other family members to express feelings/concerns, and actively listen.   - Educate father/SO about benefits of breast feeding and how to manage common lactation challenges. - Recommend avoidance of specific medications or substances incompatible with breast feeding.  - Assess and monitor for signs of nipple pain/trauma. - Instruct and provide assistance with proper latch. - Review techniques for milk expression (breast pumping) and storage of breast milk. Provide pumping equipment/supplies, instructions and assistance, as needed. - Encourage rooming-in and breast feeding on demand.  - Encourage skin-to-skin contact. - Provide LC support as needed. - Assess for and manage engorgement. - Provide breast feeding education handouts and information on community breast feeding support. Outcome: Progressing  Goal: Establishment of adequate milk supply with medication/procedure interruptions  Description: INTERVENTIONS:  - Review techniques for milk expression (breast pumping). - Provide pumping equipment/supplies, instructions, and assistance until it is safe to breastfeed infant. Outcome: Progressing  Goal: Appropriate maternal -  bonding  Description: INTERVENTIONS:  - Assess caregiver- interactions. - Assess caregiver's emotional status and coping mechanisms. - Encourage caregiver to participate in  daily care. - Assess support systems available to mother/family.  - Provide /case management support as needed. Outcome: Progressing    Sat with patient and updated patient and family on plan of care. Pain medication discussed and answered all questions. Vitals are WNL. Breastfeeding and bottle feeding as needed. Lochia WNL and fundus is firm. Will call RN with any questions.

## 2023-01-16 NOTE — DISCHARGE SUMMARY
[facility]  Discharge Summary    Bethel Castaneda Patient Status:  Inpatient    2000 MRN [id number]   Location [facility] 3SE Attending Kristina Walsh MD   [address] Day # 2 PCP Flores Solano One [facility]     Date of Admission: 2023    Date of Discharge: 23    Admitting Diagnosis: ROR/ROL  Pregnancy   (normal spontaneous vaginal delivery)    Discharge Diagnosis: Patient Active Problem List:     PCOS (polycystic ovarian syndrome)     Seronegative rheumatoid arthritis ([address]     Pregnancy      (normal spontaneous vaginal delivery)      Reason for Admission: [facility] Course: Admitted with SROM, started on pitocin and progressed in labor. Delivered a baby girl. Procedures: Vaginal delivery    Complications: None    Disposition: Home or Self Care    Discharge Condition: Good    Discharge Medications: Current Discharge Medication List    START taking these medications    acetaminophen 500 MG Oral Tab  Take 1 tablet (500 mg total) by mouth every 6 (six) hours as needed. Refills: 0    ibuprofen 600 MG Oral Tab  Take 1 tablet (600 mg total) by mouth every 6 (six) hours as needed. Qty: 30 tablet Refills: 0      CONTINUE these medications which have NOT CHANGED    prenatal vitamin with DHA 27-0.8-228 MG Oral Cap  Take 1 capsule by mouth daily. STOP taking these medications    Desogestrel-Ethinyl Estradiol (RECLIPSEN) 0.15-30 MG-MCG Oral Tab    Hydroxychloroquine Sulfate 200 MG Oral Tab          Follow up Visits:  Follow-up with primary ob/gyn in 6 weeks    Other Discharge Instructions: none    Alicia Maldonado MD  2023  8:03 AM

## 2023-01-16 NOTE — BH PROGRESS NOTE
TOM PPD SCREENING    Reason for Referral: EPDS 11    Current Stressors:    History of PPD: N/A, first baby   Financial: \"no\"   Other: \"for questions 4 and 5 it was some rumination about how I would be as a mom, bringing her into the world and not feeling like I'd be enough. I lost my grandma about two years ago, all of our birthdays are in January, her death anniversary is a lot as well, it was hard coming to realization that the woman who truly helped raise me wouldn't be here to see Dina Olivo come into the world. For question 3 that's just who I am in general, I want to always make things right or fix them if and when I can, I begin to self doubt a lot in that too - what could I have done to fix thing or what could I have done to change the whole situation. I will say though that I do have a good idea of when something is completely out of my control. I was still up and going until the last day, I stopped working a week and a half prior, having that idle time made it hard for me to focus on the things I wanted to do around the house and just in general from a day to day perspective because of the ruminating anxiety. For question 9, I am a cryer at baseline, there's no rhythm or reason that will trigger tears for me. With the hormones I was crying more than usual this past week. For question 7 I answered that with regard to both anxiety and the physical issues of the end of the pregnancy - I was finding it hard to stay asleep when she'd kick at my ribs or I would have to get up to use the bathroom, the anxiety made it hard to initially fall asleep. For question 8 I answered that with regard to when the anxiety would get pretty high, I'd start to get some secondary sadness. \"    Maye Ventura has no hx outpatient/iop/php/inpt tx. No hx alcohol, tobacco, cannabis or illicit substance use. Mental Health Status:    Current Symptoms: \"since delivery I've been feeling fine, it's still sinking in that I am a mom.  With feeding, it's going good because she's eating but I have gotten self conscious about my milk not coming in yet but we're working on it. My anxiety and sadness were up prior to delivery but now that Digna Newton is here it's going back to baseline. Appearance/General Behavior: stated age female sitting in hospital bed holding baby   General Cognitive Functioning: intact   Thought Process: linear, sequential, goal oriented   Thought Content: appropriate to situation   Mood/Affect: calm, affect congruent to mood   Orientation: a/o x4   Speech: normal rate, rhythm, and volume   Homicidal/Suicidal Ideation/Plan: denies SI/HI/SIB, CSSR = 0, INDICATING LOW RISK    Living Arrangement:    Who Resides at Home: \"I live in [place] with my  and now baby Priscilla. 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 \"   Has Other Support Network: \"my support is family\"    Plan:    Provide PPD Information:     Postpartum Depression Resources Given: yes    Cradle Talk Information Given: yes    Depression During and After Pregnancy Information given: yes   Provide TOM Contact Information: in discharge instructions   Other Information Given: [facility]  supports, MOPS, in network providers closer to her home    [facility]    Note to patient:  The [facility] makes medical notes like these available to patients in the interest of transparency. However, be advised this is a medical document. It is intended as peer to peer communication. It is written in medical language and may contain abbreviations or verbiage that are unfamiliar. It may appear blunt or direct.  Medical documents are intended to carry relevant information, facts as evident, and the clinical opinion of the practitioner.

## 2023-01-16 NOTE — LACTATION NOTE
LACTATION NOTE - MOTHER      Evaluation Type: Inpatient    Problems identified  Problems identified: Knowledge deficit;Milk supply not WNL  Milk supply not WNL: Reduced (potential)  Problems Identified Other: nipple shield and supplementation, BF X1 last 24 hours    Maternal history  Maternal history: Polycystic ovarian syndrome (PCOS);Depression;Gestational diabetes  Other/comment: arthritis    Breastfeeding goal  Breastfeeding goal: To maintain breast milk feeding per patient goal    Maternal Assessment  Prior breastfeeding experience (comment below): Primip  Breastfeeding Assistance: LC assistance declined at this time    Pain assessment  Location/Comment: denies    Guidelines for use of:  Current use of pump[de-identified] available in room, set up, minimal usage  Suggested use of pump: For comfort as needed;Pump after nursing if a nipple shield is used;Pump if infant is not latching to breast;Pump each time a supplement is offered  Reported pumping volumes (ml): drops  Other (comment): Reviewed continued lactation support via warm line and OP services

## 2023-02-13 ENCOUNTER — TELEPHONE (OUTPATIENT)
Dept: OBGYN UNIT | Facility: HOSPITAL | Age: 23
End: 2023-02-13

## 2023-02-16 ENCOUNTER — TELEPHONE (OUTPATIENT)
Dept: OBGYN UNIT | Facility: HOSPITAL | Age: 23
End: 2023-02-16

## 2024-08-26 LAB
AMB EXT TREPONEMAL ANTIBODIES: NONREACTIVE
HIV RESULT OB: NEGATIVE

## 2024-10-21 LAB — AMB EXT STREP B CULTURE: POSITIVE

## 2024-11-07 ENCOUNTER — TELEPHONE (OUTPATIENT)
Dept: OBGYN UNIT | Facility: HOSPITAL | Age: 24
End: 2024-11-07

## 2024-11-09 ENCOUNTER — HOSPITAL ENCOUNTER (INPATIENT)
Facility: HOSPITAL | Age: 24
LOS: 1 days | Discharge: HOME | End: 2024-11-10
Attending: OBSTETRICS & GYNECOLOGY | Admitting: OBSTETRICS & GYNECOLOGY
Payer: COMMERCIAL

## 2024-11-09 ENCOUNTER — ANESTHESIA EVENT (OUTPATIENT)
Dept: OBGYN UNIT | Facility: HOSPITAL | Age: 24
End: 2024-11-09
Payer: COMMERCIAL

## 2024-11-09 ENCOUNTER — ANESTHESIA (OUTPATIENT)
Dept: OBGYN UNIT | Facility: HOSPITAL | Age: 24
End: 2024-11-09
Payer: COMMERCIAL

## 2024-11-09 LAB
ANTIBODY SCREEN: NEGATIVE
BASOPHILS # BLD AUTO: 0.03 X10(3) UL (ref 0–0.2)
BASOPHILS NFR BLD AUTO: 0.3 %
DEPRECATED RDW RBC AUTO: 40.8 FL (ref 35.1–46.3)
EOSINOPHIL # BLD AUTO: 0.07 X10(3) UL (ref 0–0.7)
EOSINOPHIL NFR BLD AUTO: 0.8 %
ERYTHROCYTE [DISTWIDTH] IN BLOOD BY AUTOMATED COUNT: 13.7 % (ref 11–15)
GLUCOSE BLDC GLUCOMTR-MCNC: 144 MG/DL (ref 70–99)
GLUCOSE BLDC GLUCOMTR-MCNC: 86 MG/DL (ref 70–99)
HCT VFR BLD AUTO: 35.7 %
HGB BLD-MCNC: 12 G/DL
IMM GRANULOCYTES # BLD AUTO: 0.05 X10(3) UL (ref 0–1)
IMM GRANULOCYTES NFR BLD: 0.6 %
LYMPHOCYTES # BLD AUTO: 1.96 X10(3) UL (ref 1–4)
LYMPHOCYTES NFR BLD AUTO: 21.9 %
MCH RBC QN AUTO: 27.6 PG (ref 26–34)
MCHC RBC AUTO-ENTMCNC: 33.6 G/DL (ref 31–37)
MCV RBC AUTO: 82.3 FL
MONOCYTES # BLD AUTO: 0.5 X10(3) UL (ref 0.1–1)
MONOCYTES NFR BLD AUTO: 5.6 %
NEUTROPHILS # BLD AUTO: 6.36 X10 (3) UL (ref 1.5–7.7)
NEUTROPHILS # BLD AUTO: 6.36 X10(3) UL (ref 1.5–7.7)
NEUTROPHILS NFR BLD AUTO: 70.8 %
PLATELET # BLD AUTO: 339 10(3)UL (ref 150–450)
RBC # BLD AUTO: 4.34 X10(6)UL
RH BLOOD TYPE: POSITIVE
T PALLIDUM AB SER QL IA: NONREACTIVE
WBC # BLD AUTO: 9 X10(3) UL (ref 4–11)

## 2024-11-09 PROCEDURE — 86901 BLOOD TYPING SEROLOGIC RH(D): CPT | Performed by: OBSTETRICS & GYNECOLOGY

## 2024-11-09 PROCEDURE — 86780 TREPONEMA PALLIDUM: CPT | Performed by: OBSTETRICS & GYNECOLOGY

## 2024-11-09 PROCEDURE — 99214 OFFICE O/P EST MOD 30 MIN: CPT

## 2024-11-09 PROCEDURE — 86850 RBC ANTIBODY SCREEN: CPT | Performed by: OBSTETRICS & GYNECOLOGY

## 2024-11-09 PROCEDURE — 86900 BLOOD TYPING SEROLOGIC ABO: CPT | Performed by: OBSTETRICS & GYNECOLOGY

## 2024-11-09 PROCEDURE — 85025 COMPLETE CBC W/AUTO DIFF WBC: CPT | Performed by: OBSTETRICS & GYNECOLOGY

## 2024-11-09 PROCEDURE — 82962 GLUCOSE BLOOD TEST: CPT

## 2024-11-09 RX ORDER — CITRIC ACID/SODIUM CITRATE 334-500MG
30 SOLUTION, ORAL ORAL AS NEEDED
Status: DISCONTINUED | OUTPATIENT
Start: 2024-11-09 | End: 2024-11-09 | Stop reason: HOSPADM

## 2024-11-09 RX ORDER — ACETAMINOPHEN 500 MG
500 TABLET ORAL EVERY 6 HOURS PRN
Status: DISCONTINUED | OUTPATIENT
Start: 2024-11-09 | End: 2024-11-10

## 2024-11-09 RX ORDER — DOCUSATE SODIUM 100 MG/1
100 CAPSULE, LIQUID FILLED ORAL 2 TIMES DAILY
Status: DISCONTINUED | OUTPATIENT
Start: 2024-11-10 | End: 2024-11-10

## 2024-11-09 RX ORDER — BUPIVACAINE HCL/0.9 % NACL/PF 0.25 %
5 PLASTIC BAG, INJECTION (ML) EPIDURAL AS NEEDED
Status: DISCONTINUED | OUTPATIENT
Start: 2024-11-09 | End: 2024-11-10

## 2024-11-09 RX ORDER — NALBUPHINE HYDROCHLORIDE 10 MG/ML
2.5 INJECTION INTRAMUSCULAR; INTRAVENOUS; SUBCUTANEOUS
Status: DISCONTINUED | OUTPATIENT
Start: 2024-11-09 | End: 2024-11-10

## 2024-11-09 RX ORDER — ACETAMINOPHEN 500 MG
1000 TABLET ORAL EVERY 6 HOURS PRN
Status: DISCONTINUED | OUTPATIENT
Start: 2024-11-09 | End: 2024-11-09 | Stop reason: HOSPADM

## 2024-11-09 RX ORDER — BUPIVACAINE HYDROCHLORIDE 2.5 MG/ML
INJECTION, SOLUTION EPIDURAL; INFILTRATION; INTRACAUDAL
Status: COMPLETED | OUTPATIENT
Start: 2024-11-09 | End: 2024-11-09

## 2024-11-09 RX ORDER — CALCIUM CARBONATE 500 MG/1
1000 TABLET, CHEWABLE ORAL EVERY 4 HOURS PRN
Status: DISCONTINUED | OUTPATIENT
Start: 2024-11-09 | End: 2024-11-09 | Stop reason: HOSPADM

## 2024-11-09 RX ORDER — BISACODYL 10 MG
10 SUPPOSITORY, RECTAL RECTAL ONCE AS NEEDED
Status: DISCONTINUED | OUTPATIENT
Start: 2024-11-09 | End: 2024-11-10

## 2024-11-09 RX ORDER — LIDOCAINE HYDROCHLORIDE 10 MG/ML
INJECTION, SOLUTION INFILTRATION; PERINEURAL
Status: COMPLETED | OUTPATIENT
Start: 2024-11-09 | End: 2024-11-09

## 2024-11-09 RX ORDER — ACETAMINOPHEN 500 MG
1000 TABLET ORAL EVERY 6 HOURS PRN
Status: DISCONTINUED | OUTPATIENT
Start: 2024-11-09 | End: 2024-11-10

## 2024-11-09 RX ORDER — SIMETHICONE 80 MG
80 TABLET,CHEWABLE ORAL 3 TIMES DAILY PRN
Status: DISCONTINUED | OUTPATIENT
Start: 2024-11-09 | End: 2024-11-10

## 2024-11-09 RX ORDER — TERBUTALINE SULFATE 1 MG/ML
0.25 INJECTION, SOLUTION SUBCUTANEOUS AS NEEDED
Status: DISCONTINUED | OUTPATIENT
Start: 2024-11-09 | End: 2024-11-09 | Stop reason: HOSPADM

## 2024-11-09 RX ORDER — IBUPROFEN 600 MG/1
600 TABLET, FILM COATED ORAL EVERY 6 HOURS
Status: DISCONTINUED | OUTPATIENT
Start: 2024-11-09 | End: 2024-11-09

## 2024-11-09 RX ORDER — BUPIVACAINE HYDROCHLORIDE 2.5 MG/ML
INJECTION, SOLUTION EPIDURAL; INFILTRATION; INTRACAUDAL
Status: DISPENSED
Start: 2024-11-09 | End: 2024-11-09

## 2024-11-09 RX ORDER — IBUPROFEN 600 MG/1
600 TABLET, FILM COATED ORAL EVERY 6 HOURS PRN
Status: DISCONTINUED | OUTPATIENT
Start: 2024-11-09 | End: 2024-11-10

## 2024-11-09 RX ORDER — ONDANSETRON 2 MG/ML
4 INJECTION INTRAMUSCULAR; INTRAVENOUS EVERY 6 HOURS PRN
Status: DISCONTINUED | OUTPATIENT
Start: 2024-11-09 | End: 2024-11-09 | Stop reason: HOSPADM

## 2024-11-09 RX ORDER — SODIUM CHLORIDE, SODIUM LACTATE, POTASSIUM CHLORIDE, CALCIUM CHLORIDE 600; 310; 30; 20 MG/100ML; MG/100ML; MG/100ML; MG/100ML
INJECTION, SOLUTION INTRAVENOUS CONTINUOUS
Status: DISCONTINUED | OUTPATIENT
Start: 2024-11-09 | End: 2024-11-09 | Stop reason: HOSPADM

## 2024-11-09 RX ORDER — LIDOCAINE HYDROCHLORIDE AND EPINEPHRINE 15; 5 MG/ML; UG/ML
INJECTION, SOLUTION EPIDURAL
Status: COMPLETED | OUTPATIENT
Start: 2024-11-09 | End: 2024-11-09

## 2024-11-09 RX ORDER — AMMONIA INHALANTS 0.04 G/.3ML
0.3 INHALANT RESPIRATORY (INHALATION) AS NEEDED
Status: DISCONTINUED | OUTPATIENT
Start: 2024-11-09 | End: 2024-11-10

## 2024-11-09 RX ORDER — LIDOCAINE HYDROCHLORIDE 10 MG/ML
30 INJECTION, SOLUTION EPIDURAL; INFILTRATION; INTRACAUDAL; PERINEURAL ONCE
Status: DISCONTINUED | OUTPATIENT
Start: 2024-11-09 | End: 2024-11-09 | Stop reason: HOSPADM

## 2024-11-09 RX ORDER — BUPIVACAINE HYDROCHLORIDE 2.5 MG/ML
20 INJECTION, SOLUTION EPIDURAL; INFILTRATION; INTRACAUDAL ONCE
Status: DISCONTINUED | OUTPATIENT
Start: 2024-11-09 | End: 2024-11-09 | Stop reason: HOSPADM

## 2024-11-09 RX ORDER — IBUPROFEN 600 MG/1
600 TABLET, FILM COATED ORAL ONCE AS NEEDED
Status: DISCONTINUED | OUTPATIENT
Start: 2024-11-09 | End: 2024-11-09 | Stop reason: HOSPADM

## 2024-11-09 RX ORDER — ACETAMINOPHEN 500 MG
500 TABLET ORAL EVERY 6 HOURS PRN
Status: DISCONTINUED | OUTPATIENT
Start: 2024-11-09 | End: 2024-11-09 | Stop reason: HOSPADM

## 2024-11-09 RX ORDER — DEXTROSE, SODIUM CHLORIDE, SODIUM LACTATE, POTASSIUM CHLORIDE, AND CALCIUM CHLORIDE 5; .6; .31; .03; .02 G/100ML; G/100ML; G/100ML; G/100ML; G/100ML
INJECTION, SOLUTION INTRAVENOUS AS NEEDED
Status: DISCONTINUED | OUTPATIENT
Start: 2024-11-09 | End: 2024-11-09 | Stop reason: HOSPADM

## 2024-11-09 RX ORDER — DOCUSATE SODIUM 100 MG/1
100 CAPSULE, LIQUID FILLED ORAL
Status: DISCONTINUED | OUTPATIENT
Start: 2024-11-09 | End: 2024-11-09

## 2024-11-09 RX ADMIN — LIDOCAINE HYDROCHLORIDE AND EPINEPHRINE 3 ML: 15; 5 INJECTION, SOLUTION EPIDURAL at 08:10:00

## 2024-11-09 RX ADMIN — LIDOCAINE HYDROCHLORIDE 5 ML: 10 INJECTION, SOLUTION INFILTRATION; PERINEURAL at 08:05:00

## 2024-11-09 RX ADMIN — BUPIVACAINE HYDROCHLORIDE 10 ML: 2.5 INJECTION, SOLUTION EPIDURAL; INFILTRATION; INTRACAUDAL at 08:10:00

## 2024-11-09 NOTE — PROGRESS NOTES
Patient up to bathroom with assist x 2.  Voided 400cc at this time. Patient transferred to mother/baby room 358 per wheelchair in stable condition with baby and personal belongings.  Accompanied by significant other and staff.  Report given to Darwin mother/baby RN.

## 2024-11-09 NOTE — ANESTHESIA POSTPROCEDURE EVALUATION
Patient: Pati Sullivan    Procedure Summary       Date: 11/09/24 Room / Location:     Anesthesia Start: 0805 Anesthesia Stop: 1425    Procedure: LABOR ANALGESIA Diagnosis:     Scheduled Providers:  Anesthesiologist: Alessio Mcnair MD    Anesthesia Type: epidural ASA Status: 2            Anesthesia Type: epidural    Vitals Value Taken Time   /84 11/09/24 1501   Temp 98 11/09/24 1503   Pulse 93 11/09/24 1501   Resp 18 11/09/24 1445   SpO2 100 % 11/09/24 1430   Vitals shown include unfiled device data.    [facility] AN Post Evaluation:   Patient Evaluated in PACU  Patient Participation: complete - patient participated  Level of Consciousness: awake  Pain Management: adequate  Airway Patency:patent  Dental exam unchanged from preop  Yes    Cardiovascular Status: acceptable  Respiratory Status: acceptable  Postoperative Hydration acceptable      Alessio Mcnair MD  11/9/2024 3:03 PM

## 2024-11-09 NOTE — ANESTHESIA PROCEDURE NOTES
Labor Analgesia    Date/Time: 11/9/2024 8:05 AM    Performed by: Alessio Mcnair MD  Authorized by: Alessio Mcnair MD      General Information and Staff    Start Time:  11/9/2024 8:05 AM  End Time:  11/9/2024 8:19 AM  Anesthesiologist:  Alessio Mcnair MD  Performed by:  Anesthesiologist  Patient Location:  OB  Reason for Block: labor epidural    Preanesthetic Checklist: patient identified, IV checked, site marked, risks and benefits discussed, monitors and equipment checked, pre-op evaluation, timeout performed, anesthesia consent and sterile technique used      Procedure Details    Patient Position:  Sitting  Prep: ChloraPrep    Monitoring:  Heart rate  Approach:  Midline    Epidural Needle    Injection Technique:  LUZ ELENA air  Needle Type:  Tuohy  Needle Gauge:  18 G  Needle Length:  3.5 in  Location:  L3-4    Spinal Needle      Catheter    Catheter Type:  Multi-orifice  Catheter Size:  20 G  Test Dose:  Negative    Assessment      Additional Comments

## 2024-11-09 NOTE — H&P
[facility]  part of [facility]    History & Physical    Pati Sullivan Patient Status:  Outpatient    2000 MRN [id number]   Location [facility] Attending Delicia Conrad MD   Hosp Day # 0 PCP JENSEN HARRISON     Date of Admission:  2024      HPI:   Pati Sullivan is a 24 year old  female, current EGA of 39w0d with an estimated date of delivery of: Estimated Date of Delivery: 24      Pati Sullivan is being admitted for labor management.    Her current obstetrical history is significant for  none .    Patient reports leaking of clear fluid since 0330  .     Fetal Movement: normal.     History   Obstetric History:   OB History    Para Term  AB Living   3 1 1   1 1   SAB IAB Ectopic Multiple Live Births   1     0 1      # Outcome Date GA Lbr Jorge/2nd Weight Sex Type Anes PTL Lv   3 Current            2 Term 23 39w6d 02:15 / 00:22 7 lb 0.9 oz (3.2 kg) F NORMAL SPONT EPI N JOHN      Complications: Meconium, Fetal tachycardia, Intraamniotic Infection, Variable decelerations, Late decelerations, Temp 100.4 or greater   1 SAB 2019     SAB        Past Medical History:   Past Medical History:    Arthritis    has a degenerative connective tissue disorder    Depression    Gestational diabetes (HCC)    Reflux esophagitis    Sciatica    Seronegative rheumatoid arthritis (HCC)    See Dr. Toro at [place] for Rheumatology     Past Social History: No past surgical history on file.  Family History:   Family History   Problem Relation Age of Onset    Hypertension Mother     Diabetes Maternal Grandmother     Diabetes Maternal Aunt     Other (kidney cancer) Maternal Aunt      Social History:   Social History     Tobacco Use    Smoking status: Never    Smokeless tobacco: Never   Substance Use Topics    Alcohol use: Not Currently        Allergies/Medications:   Allergies:   Allergies[1]  Medications:  Prescriptions Prior to Admission[2]    Review of  Systems:   As documented in HPI    leaking of clear fluid since 0330    Physical Exam:   Temp:  [97.6 °F (36.4 °C)-98.1 °F (36.7 °C)] 98.1 °F (36.7 °C)  Pulse:  [] 84  Resp:  [16] 16  BP: ()/() 112/69  SpO2:  [97 %-100 %] 97 %    Constitutional: alert, appears stated age, and cooperative  Respiratory:  non-labored respirations  Cardiac: regular rate and rhythm  Abdomen: soft, nontender, nondistended, no abnormal masses, no epigastric pain and FHT present  Fetal Surveillance:  140 BPM; Fetal heart variability: moderate  Sterile vaginal exam:  per nurse, 4/90/-2      Results:     Lab Results   Component Value Date    TREPONEMALAB Nonreactive 11/09/2024    RAPIDHIVSCRN Negative 08/26/2024    ABO A 11/09/2024    RH Positive 11/09/2024    WBC 9.0 11/09/2024    HGB 12.0 11/09/2024    HCT 35.7 11/09/2024    .0 11/09/2024    PTT 30.4 12/03/2022    INR 1.09 12/03/2022       No results found for: \"DDIMER\", \"ESRML\", \"ESRPF\", \"CRP\", \"BNP\", \"MG\", \"PHOS\", \"TROP\", \"TROPHS\", \"CK\", \"CKMB\", \"MADHAVI\", \"RPR\", \"B12\", \"ETOH\", \"COLORUR\", \"CLARITY\", \"SPECGRAVITY\", \"PROUR\", \"GLUUR\", \"KETUR\", \"BILUR\", \"BLOODURINE\", \"NITRITE\", \"UROBILINOGEN\", \"LEUUR\", \"UASA\", \"POCGLU\", \"BLDCUL\", \"BLDSMR\"    No results found.      Assessment/Plan:    Pati Sullivan is at an estimated gestational age of 39w0d with an estimated date of delivery of:  Estimated Date of Delivery: 11/16/24    Active phase labor.  Obstetrical history significant for  none .    Admission problem(s):    Pregnancy (HCC)    Expectant management      Risks, benefits, alternatives and possible complications have been discussed in detail with the patient.   Pre-admission, admission, and post admission procedures and expectations were discussed in detail.    All questions answered, all appropriate consents will be signed at the Hospital. Admission is planned for today.   Expectant management..    Amada Gardner MD  11/9/2024  11:56 AM       [1] No Known Allergies  [2]    Medications Prior to Admission   Medication Sig Dispense Refill Last Dose/Taking    prenatal vitamin with DHA 27-0.8-228 MG Oral Cap Take 1 capsule by mouth daily.   11/8/2024    acetaminophen 500 MG Oral Tab Take 1 tablet (500 mg total) by mouth every 6 (six) hours as needed.  0     ibuprofen 600 MG Oral Tab Take 1 tablet (600 mg total) by mouth every 6 (six) hours as needed. 30 tablet 0

## 2024-11-09 NOTE — ANESTHESIA PREPROCEDURE EVALUATION
Anesthesia PreOp Note    HPI:     Pati Sullivan is a 24 year old female who presents for preoperative consultation requested by: * No surgeons listed *    Date of Surgery: 2024    * No procedures listed *  Indication: * No pre-op diagnosis entered *    Relevant Problems   No relevant active problems       NPO:                         History Review:  Patient Active Problem List    Diagnosis Date Noted    Pregnancy ([facility]) 2023     (normal spontaneous vaginal delivery) ([facility]) 2023    Seronegative rheumatoid arthritis ([facility]) 2022    PCOS (polycystic ovarian syndrome) 10/12/2019       Past Medical History:    Arthritis    has a degenerative connective tissue disorder    Depression    Gestational diabetes ([facility])    Reflux esophagitis    Sciatica    Seronegative rheumatoid arthritis ([facility])    See Dr. Toro at [place] for Rheumatology       No past surgical history on file.    Prescriptions Prior to Admission[1]  Current Medications and Prescriptions Ordered in Epic[2]    Allergies[3]    Family History   Problem Relation Age of Onset    Hypertension Mother     Diabetes Maternal Grandmother     Diabetes Maternal Aunt     Other (kidney cancer) Maternal Aunt      Social History     Socioeconomic History    Marital status: Single   Tobacco Use    Smoking status: Never    Smokeless tobacco: Never   Vaping Use    Vaping status: Never Used   Substance and Sexual Activity    Alcohol use: Not Currently    Drug use: Never    Sexual activity: Yes     Partners: Male     Birth control/protection: Pill, Condom       Available pre-op labs reviewed.  Lab Results   Component Value Date    WBC 9.0 2024    RBC 4.34 2024    HGB 12.0 2024    HCT 35.7 2024    MCV 82.3 2024    MCH 27.6 2024    MCHC 33.6 2024    RDW 13.7 2024    .0 2024     Lab Results   Component Value Date    PGLU 144 (H) 2024          Vital Signs:  Body mass index is 33.99 kg/m².   weight  is 89.8 kg (198 lb). Her oral temperature is 97.6 °F (36.4 °C). Her blood pressure is 136/82 and her pulse is 94. Her respiration is 16.   Vitals:    11/09/24 0554 11/09/24 0730   BP: 136/82    Pulse: 94    Resp: 16    Temp: 97.6 °F (36.4 °C) 97.6 °F (36.4 °C)   TempSrc: Oral Oral   Weight: 89.8 kg (198 lb)         Anesthesia Evaluation     Patient summary reviewed and Nursing notes reviewed    No history of anesthetic complications   Airway   Mallampati: II  Neck ROM: full  Dental      Pulmonary - negative ROS and normal exam   Cardiovascular - negative ROS and normal exam    Neuro/Psych - negative ROS     GI/Hepatic/Renal - negative ROS     Endo/Other - negative ROS   (+) diabetes mellitus  Abdominal  - normal exam  (+) obese                 Anesthesia Plan:   ASA:  2  Plan:   Epidural  Informed Consent Plan and Risks Discussed With:  Patient      I have informed Pati Sullivan and/or legal guardian or family member of the nature of the anesthetic plan, benefits, risks including possible dental damage if relevant, major complications, and any alternative forms of anesthetic management.   All of the patient's questions were answered to the best of my ability. The patient desires the anesthetic management as planned.  Alessio Mcnair MD  11/9/2024 8:21 AM  Present on Admission:  **None**           [1]   Medications Prior to Admission   Medication Sig Dispense Refill Last Dose/Taking    prenatal vitamin with DHA 27-0.8-228 MG Oral Cap Take 1 capsule by mouth daily.   11/8/2024    acetaminophen 500 MG Oral Tab Take 1 tablet (500 mg total) by mouth every 6 (six) hours as needed.  0     ibuprofen 600 MG Oral Tab Take 1 tablet (600 mg total) by mouth every 6 (six) hours as needed. 30 tablet 0    [2]   Current Facility-Administered Medications Ordered in Epic   Medication Dose Route Frequency Provider Last Rate Last Admin    acetaminophen (Tylenol Extra Strength) tab 500 mg  500 mg Oral Q6H PRN Delicia Conrad MD         acetaminophen (Tylenol Extra Strength) tab 1,000 mg  1,000 mg Oral Q6H PRN Delicia Conrad MD        ibuprofen (Motrin) tab 600 mg  600 mg Oral Once PRN Delicia Conrad MD        ondansetron (Zofran) 4 MG/2ML injection 4 mg  4 mg Intravenous Q6H PRN Delicia Conrad MD        oxyTOCIN in sodium chloride 0.9% (Pitocin) 30 Units/500mL infusion premix  62.5-900 israel-units/min Intravenous Continuous Delicia Conrad MD        terbutaline (Brethine) 1 MG/ML injection 0.25 mg  0.25 mg Subcutaneous PRN Delicia Conrad MD        sodium citrate-citric acid (Bicitra) 500-334 MG/5ML oral solution 30 mL  30 mL Oral PRN Delicia Conrad MD        lidocaine PF (Xylocaine-MPF) 1% injection  30 mL Intradermal Once Delicia Conrad MD        lactated ringers infusion   Intravenous Continuous Delicia Conrad MD        dextrose in lactated ringers 5% infusion   Intravenous PRN Delicia Conrad MD        lactated ringers IV bolus 500 mL  500 mL Intravenous PRN Delicia Conrad MD        fentaNYL (Sublimaze) 50 mcg/mL injection 100 mcg  100 mcg Intravenous Once Delicia Conrad MD        fentaNYL (Sublimaze) 50 mcg/mL injection 50 mcg  50 mcg Intravenous Q30 Min PRN Delicia Conrad MD        calcium carbonate (Tums) chewable tab 1,000 mg  1,000 mg Oral Q4H PRN Delicia Conrad MD        ampicillin (Omnipen) 1 g in sodium chloride 0.9% 100 mL IVPB-MBP  1 g Intravenous Q4H Delicia Conrad MD        oxyTOCIN in sodium chloride 0.9% (Pitocin) 30 Units/500mL infusion premix  0.5-20 israel-units/min Intravenous Continuous Delicia Conrad MD        fentaNYL-bupivacaine 2 mcg/mL-0.125% in sodium chloride 0.9% 100 mL EPIDURAL infusion premix   Epidural Continuous Alessio Mcnair MD        fentaNYL (Sublimaze) 50 mcg/mL injection 100 mcg  100 mcg Epidural Once Alessio Mcnair MD        bupivacaine PF (Marcaine) 0.25% injection  20 mL Epidural Once Alessio Mcnair MD        EPHEDrine (PF) 25 MG/5 ML injection 5 mg  5  mg Intravenous PRN Alessio Mcnair MD        nalbuphine (Nubain) 10 mg/mL injection 2.5 mg  2.5 mg Intravenous Q15 Min PRN Alessio Mcnair MD         No current [facility]-ordered outpatient medications on file.   [3] No Known Allergies

## 2024-11-09 NOTE — PROGRESS NOTES
Pt is a 24 year old female admitted to TR2/TR2-A.     Chief Complaint   Patient presents with    R/o Rom    R/o Labor     Patient began having contractions and felt LOF at 0300.       Pt is  39w0d intra-uterine pregnancy.  History obtained, consents signed. Oriented to room, staff, and plan of care.

## 2024-11-09 NOTE — L&D DELIVERY NOTE
Nate, Girl [[id number]]      Labor Events     labor?: No   steroids?: None  Antibiotics received during labor?: Yes  Antibiotics (enter # doses in comment): ampicillin (Comment: x2)  Rupture date/time: 2024 0300     Rupture type: SROM  Fluid color: Clear  Labor type: Spontaneous Onset of Labor  Augmentation: Oxytocin  Indications for augmentation: Ineffective Contraction Pattern  Intrapartum & labor complications: Group B beta strep +       Labor Event Times    Labor onset date/time: 2024 0330  Dilation complete date/time: 2024 1329  Start pushing date/time: 2024 1333       [place] Presentation    No data filed       Operative Delivery    No data filed       Shoulder Dystocia    No data filed       Anesthesia    Method: Epidural              [place] Delivery      Head delivery date/time: 2024 14:19:45   Delivery date/time:  24 14:20:59       Details:            Delivery Providers    Delivering Clinician: Amada Gardner MD   Delivery personnel:  Provider Role   Opal Carey, RN Baby Nurse   Deysi Florez RN Delivery Nurse             Cord    No data filed       Resuscitation    Method: None        Measurements      Weight: 3260 g 7 lb 3 oz                Placenta    Date/time: 2024 1425  Removal: Spontaneous  Appearance: Intact  Disposition: Discarded       Apgars    Living status: Living   Apgar Scoring Key:    0 1 2    Skin color Blue or pale Acrocyanotic Completely pink    Heart rate Absent <100 bpm >100 bpm    Reflex irritability No response Grimace Cry or active withdrawal    Muscle tone Limp Some flexion Active motion    Respiratory effort Absent Weak cry; hypoventilation Good, crying              1 Minute:  5 Minute:  10 Minute:  15 Minute:  20 Minute:      Skin color: 0  1       Heart rate: 2  2       Reflex irritablity: 2  2       Muscle tone: 1  2       Respiratory effort: 2  2       Total: 7  9          Apgars assigned by: MORALES  REJI GAN  [place] disposition: with mother       Skin to Skin    Skin to skin initiated date/time: 2024 1420  Skin to skin with: Mother       Vaginal Count    Initial count RN: Deysi Florez RN  Initial count Tech: Ilsa Pratt   Sharps    Initial counts 10   0    Final counts               Lacerations    Episiotomy: None  Perineal lacerations: None      Vaginal laceration?: No      Cervical laceration?: No    Clitoral laceration?: No                Fetal vertex delivered over intact perineum.  No nuchal cord.  Right shoulder impacted, through maternal repositioning and McRobert's maneuver, eventually was able to rotate right shoulder to deliver shoulder.  Remainder of infant delivered without difficulty.  Time on perineum was 1 min, 15 sec.  Placenta delivered spontaneously.  Inspection of perineum revealed no significant lacerations.

## 2024-11-09 NOTE — PROGRESS NOTES
Pt is a 24 year old female admitted to LDR2/LDR2-A.     Chief Complaint   Patient presents with    R/o Rom    R/o Labor     Patient began having contractions and felt LOF at 0300.       Pt is  39w0d intra-uterine pregnancy.  History obtained, consents signed. Oriented to room, staff, and plan of care.

## 2024-11-10 VITALS
SYSTOLIC BLOOD PRESSURE: 117 MMHG | WEIGHT: 198 LBS | HEART RATE: 81 BPM | BODY MASS INDEX: 34 KG/M2 | TEMPERATURE: 98 F | RESPIRATION RATE: 16 BRPM | OXYGEN SATURATION: 100 % | DIASTOLIC BLOOD PRESSURE: 73 MMHG

## 2024-11-10 LAB
BASOPHILS # BLD AUTO: 0.04 X10(3) UL (ref 0–0.2)
BASOPHILS NFR BLD AUTO: 0.4 %
DEPRECATED RDW RBC AUTO: 41.1 FL (ref 35.1–46.3)
EOSINOPHIL # BLD AUTO: 0.17 X10(3) UL (ref 0–0.7)
EOSINOPHIL NFR BLD AUTO: 1.6 %
ERYTHROCYTE [DISTWIDTH] IN BLOOD BY AUTOMATED COUNT: 14 % (ref 11–15)
HCT VFR BLD AUTO: 33.8 %
HGB BLD-MCNC: 11 G/DL
IMM GRANULOCYTES # BLD AUTO: 0.07 X10(3) UL (ref 0–1)
IMM GRANULOCYTES NFR BLD: 0.7 %
LYMPHOCYTES # BLD AUTO: 2.66 X10(3) UL (ref 1–4)
LYMPHOCYTES NFR BLD AUTO: 25.2 %
MCH RBC QN AUTO: 26.7 PG (ref 26–34)
MCHC RBC AUTO-ENTMCNC: 32.5 G/DL (ref 31–37)
MCV RBC AUTO: 82 FL
MONOCYTES # BLD AUTO: 0.63 X10(3) UL (ref 0.1–1)
MONOCYTES NFR BLD AUTO: 6 %
NEUTROPHILS # BLD AUTO: 7 X10 (3) UL (ref 1.5–7.7)
NEUTROPHILS # BLD AUTO: 7 X10(3) UL (ref 1.5–7.7)
NEUTROPHILS NFR BLD AUTO: 66.1 %
PLATELET # BLD AUTO: 317 10(3)UL (ref 150–450)
RBC # BLD AUTO: 4.12 X10(6)UL
WBC # BLD AUTO: 10.6 X10(3) UL (ref 4–11)

## 2024-11-10 PROCEDURE — 85025 COMPLETE CBC W/AUTO DIFF WBC: CPT | Performed by: OBSTETRICS & GYNECOLOGY

## 2024-11-10 NOTE — PROGRESS NOTES
[facility]  part of [facility]    OB/GYNE Progress Note      Pati Sullivan Patient Status:  Inpatient    2000 MRN [id number]   Location [facility] 3SE Attending Delicia Conrad MD   Hosp Day # 1 PCP JENSEN HARRISON     Subjective     Denies focal complaints.  Lochia normal, pain well controlled.    Review of Systems:  Constitutional: feeling better, pain improved, tolerating diet well, lochia present, and up in bed      Objective   Vital signs in last 24 hours:  Temp:  [98 °F (36.7 °C)-99.2 °F (37.3 °C)] 98.2 °F (36.8 °C)  Pulse:  [] 78  Resp:  [16-18] 16  BP: ()/(54-84) 120/73  SpO2:  [97 %-100 %] 100 %    Input/Output:    Intake/Output Summary (Last 24 hours) at 11/10/2024 0827  Last data filed at 2024 1645  Gross per 24 hour   Intake --   Output 1760 ml   Net -1760 ml       Weight (Last 6):  Wt Readings from Last 6 Encounters:   24 198 lb (89.8 kg)   23 183 lb (83 kg)   22 139 lb (63 kg)   10/01/21 140 lb (63.5 kg)   21 140 lb (63.5 kg)   20 127 lb (57.6 kg)     Body mass index is 33.99 kg/m².    Constitutional: comfortable  Uterus: fundus firm  Extremities: No evidence of DVT seen on physical exam.      Results:     Lab Results   Component Value Date    WBC 10.6 11/10/2024    HGB 11.0 11/10/2024    HCT 33.8 11/10/2024    .0 11/10/2024         No results found.      Assessment/Plan       Assessment  Problems:   Patient Active Problem List   Diagnosis    PCOS (polycystic ovarian syndrome)    Seronegative rheumatoid arthritis (HCC)    Pregnancy (HCC)     (normal spontaneous vaginal delivery) (HCC)          PPD #1 s/p     Doing well  Desires discharge home    Amada Gardner MD  11/10/2024  8:27 AM

## 2024-11-10 NOTE — PROGRESS NOTES
Discharge order received from MD.     Discharge instructions and medications reviewed with patient. ID band matched with baby band. Follow up instructions with OB given. Mother verbalizes understanding of instructions and is in stable condition. Mom discharged home with infant.

## 2024-11-10 NOTE — DISCHARGE INSTRUCTIONS
What to Expect:  Abdominal cramping after delivery especially if you are breastfeeding.   Vaginal bleeding for about 4-6 weeks that may be followed by a yellow or white discharge for a few more weeks.  Your period will resume in approximately 6-8 weeks, unless you are breastfeeding.    If you are bottle feeding, you may notice breast engorgement in about 3 days.  Your breast may be sore and hard. Please wear a tight fitted bra or sports bra for 24-36 hours to help prevent your breast from producing milk, and use ice packs to relive any discomfort.  If you are breastfeeding, nipple dryness is very common the first few days.    Constipation is common after having a baby.  Please increase fluid and fiber in your diet.       Over-The-Counter Medication  Non-prescription anti-inflammatory medications can also help to ease the pain.  You may take Aleve, Tylenol or Ibuprofen   Colace or Metamucil for Constipation  Lanolin for dry nipples  Tucks, Witch Hazel and Epifoam for vaginal/perineum discomfort.   Drink a full glass of water with oral medication and take as directed.    Bathing/Showers  You may resume showers  No baths, swimming, hot tubs until your post-partum visit     Home Medication  Resume your home medications as instructed     Diet  Resume your normal diet     Activity  Refrain from vaginal intercourse, vaginal suppositories, tampon use or douches until after your post-partum visit.  No exercising for 4 weeks  You may climb stairs minimally for the 1st week.    Do not do heavy housework for at least 2-3 weeks     Return to Work or School  You may return to work in approximately 6 weeks  Contact your obstetrician’s office, if you need a medical release.     Driving  Avoid driving if you are taking narcotics for pain relief.     Follow-up Appointment with Your Obstetrician  Call your obstetrician’s office today for an appointment in 4-6 weeks.    Verify your appointment date, day, time, and location.  At your  1st post-partum office visit:  Your progress will be evaluated, findings reviewed, and any additional concerns and instructions will be discussed.     Questions or Concerns  Call your obstetrician’s office if you experience the following:  Severe pain not controlled by pain medication  Foul smelling vaginal discharge  Heavy bleeding  Shortness of breath  Fever  Redness, increased swelling or drainage from your incision  Crying and periods of sadness that prevents you from caring for yourself and your baby  Burning sensation during urination or inability to urinate  Swelling, redness or abnormal warmth to your leg/calf

## 2024-11-10 NOTE — LACTATION NOTE
11/10/24 0910   Evaluation Type   Evaluation Type Inpatient   Problems identified   Problems identified Knowledge deficit   Breastfeeding goal   Breastfeeding goal To maintain breast milk feeding per patient goal   Maternal Assessment   Prior breastfeeding experience (comment below) Multip;Unsuccessful   Breastfeeding Assistance LC assistance declined at this time   Guidelines for use of:   Other (comment) Mom reports feeding plan going well, denies need for additional support at this time. Encouraged to call LC for support as needed.

## 2024-11-10 NOTE — DISCHARGE SUMMARY
[facility]  Discharge Summary    Pati Sullivan Patient Status:  Inpatient    2000 MRN [id number]   Location [facility] 3SE Attending Delicia Conrad MD   Hosp Day # 1 PCP JENSEN HARRISON     Date of Admission: 2024    Date of Discharge: 11/10/24    Admitting Diagnosis: pregnancy  Pregnancy (HCC)    Discharge Diagnosis:   Patient Active Problem List   Diagnosis    PCOS (polycystic ovarian syndrome)    Seronegative rheumatoid arthritis (HCC)    Pregnancy (HCC)       Reason for Admission: Labor    Hospital Course: delivered a baby girl    Procedures: Vaginal delivery    Complications: None    Disposition: Home or Self Care    Discharge Condition: Good    Discharge Medications:   Current Discharge Medication List        CONTINUE these medications which have NOT CHANGED    Details   prenatal vitamin with DHA 27-0.8-228 MG Oral Cap Take 1 capsule by mouth daily.      acetaminophen 500 MG Oral Tab Take 1 tablet (500 mg total) by mouth every 6 (six) hours as needed.  Refills: 0      ibuprofen 600 MG Oral Tab Take 1 tablet (600 mg total) by mouth every 6 (six) hours as needed.  Qty: 30 tablet, Refills: 0             Follow up Visits: Follow-up with primary ob/gyn in 6 weeks    Other Discharge Instructions: none    Amada Gardner MD  11/10/2024  8:28 AM

## 2024-11-30 ENCOUNTER — TELEPHONE (OUTPATIENT)
Dept: OBGYN UNIT | Facility: HOSPITAL | Age: 24
End: 2024-11-30

## (undated) NOTE — LETTER
[facility]  Administration of Anesthesia  I Pati Sullivan agree to be cared for by a physician anesthesiologist alone and/or with a nurse anesthetist, who is specially trained to monitor me and give me medicine to put me to sleep or keep me comfortable during my procedure    I understand that my anesthesiologist and/or anesthetist is not an employee or agent of [facility] or [employer]. He or she works for [facility]    As the patient asking for anesthesia services, I agree to:  Allow the anesthesiologist (anesthesia doctor) to give me medicine and do additional procedures as necessary. Some examples are: Starting or using an “IV” to give me medicine, fluids or blood during my procedure, and having a breathing tube placed to help me breathe when I’m asleep (intubation). In the event that my heart stops working properly, I understand that my anesthesiologist will make every effort to sustain my life, unless otherwise directed by [facility] Do Not Resuscitate documents.  Tell my anesthesia doctor before my procedure:  If I am pregnant.  The last time that I ate or drank.  iii. All of the medicines I take (including prescriptions, herbal supplements, and pills I can buy without a prescription (including street drugs/illegal medications). Failure to inform my anesthesiologist about these medicines may increase my risk of anesthetic complications.  iv.If I am allergic to anything or have had a reaction to anesthesia before.  I understand how the anesthesia medicine will help me (benefits).  I understand that with any type of anesthesia medicine there are risks:  The most common risks are: nausea, vomiting, sore throat, muscle soreness, damage to my eyes, mouth, or teeth (from breathing tube placement).  Rare risks include: remembering what happened during my procedure, allergic reactions to medications, injury to my airway, heart, lungs, vision, nerves, or  muscles and in extremely rare instances death.  My doctor has explained to me other choices available to me for my care (alternatives).  Pregnant Patients (“epidural”):  I understand that the risks of having an epidural (medicine given into my back to help control pain during labor), include itching, low blood pressure, difficulty urinating, headache or slowing of the baby’s heart. Very rare risks include infection, bleeding, seizure, irregular heart rhythms and nerve injury.  Regional Anesthesia (“spinal”, “epidural”, & “nerve blocks”):  I understand that rare but potential complications include headache, bleeding, infection, seizure, irregular heart rhythms, and nerve injury.    _____________________________________________________________________________  Patient (or Representative) Signature/Relationship to Patient  Date   Time    _____________________________________________________________________________   Name (if used)    Language/Organization   Time    _____________________________________________________________________________  Nurse Anesthetist Signature     Date   Time  _____________________________________________________________________________  Anesthesiologist Signature     Date   Time  I have discussed the procedure and information above with the patient (or patient’s representative) and answered their questions. The patient or their representative has agreed to have anesthesia services.    _____________________________________________________________________________  Witness        Date   Time  I have verified that the signature is that of the patient or patient’s representative, and that it was signed before the procedure  Patient Name: Pati Sullivan     : 2000                 Printed: 2024 at 5:52 AM    Medical Record #: [id number]                                            Page 1 of 1  ----------ANESTHESIA CONSENT----------